# Patient Record
Sex: MALE | Race: WHITE | URBAN - METROPOLITAN AREA
[De-identification: names, ages, dates, MRNs, and addresses within clinical notes are randomized per-mention and may not be internally consistent; named-entity substitution may affect disease eponyms.]

---

## 2020-05-22 NOTE — IMPRESSION/PLAN
Impression: Central retinal vein occlusion, bi, with macular edema: H34.1958. Plan: Discussed diagnosis in detail with patient. Discussed treatment options with patient. Consult recommended [[facility] Discussed risks of progression. Call if [address] worsens. Needs immediate HTN TX advised to go to ER or his GP ASAP.

## 2020-05-26 NOTE — IMPRESSION/PLAN
Impression: Central retinal vein occlusion, bi, with macular edema: H34.8130. Plan: OCT ordered and performed today. Discussed diagnosis with patient. The clinical exam and OCT testing are consistent with a non-ischemic central retinal vein occlusion. There is Macular edema, which is largely contributing to patients visual symptoms. Discussed treatment options, discussed injections vs observation. R/B/A were discussed and the patient understands. The patient understands that the treatment may not improve vision loss, but should reduce the risk of further visual loss. Patient elects to proceed with Avastin OU x3 starting today.

## 2020-08-25 NOTE — IMPRESSION/PLAN
Impression: Trib rtnl vein occlusion, bilateral, with macular edema: F71.0154. Plan: OCT ordered and performed today. The clinical exam and OCT testing are consistent with branch retinal vein occlusion. Discussed diagnosis and natural history and prognosis with patient. The is evidence of Macular edema, which largely explains the patients visual symptoms. Discussed treatment options. Alternative laser vs intraocular and periocular steroids vs injections vs observation. R/B/A were discussed and the patient understands. The patient understands the treatment may not improve vision, but should reduce the risk of further visual loss.  Patient elects to proceed with Avastin OU x 3 starting today

## 2020-08-25 NOTE — ASSESSMENT/PLAN
Impression: Diagnostic Test - OCT MAC: Fair-See plan for further clinical correlation.  Plan: see chart note

## 2020-12-01 NOTE — IMPRESSION/PLAN
Impression: Trib rtnl vein occlusion, bilateral, with macular edema: W32.9632. Plan: OCT ordered and performed today. The clinical exam and OCT testing are consistent with branch retinal vein occlusion. Discussed diagnosis and natural history and prognosis with patient. The is evidence of Macular edema, which largely explains the patients visual symptoms. Discussed treatment options. Alternative laser vs intraocular and periocular steroids vs injections vs observation. R/B/A were discussed and the patient understands. The patient understands the treatment may not improve vision, but should reduce the risk of further visual loss. Patient elects to proceed with OZD OD W/ Avastin OS @ 6 weeks apart x 3.

## 2021-02-09 NOTE — IMPRESSION/PLAN
Impression: Tributary (branch) retinal vein occlusion, bilateral, stable: I02.5074. S/p OZD OD
S/p Avastin OS Plan: OCT ordered and performed today. The patient returns today for an evaluation of Branch retinal vein occlusion. The vision remains stable exam and OCT test show no evidence of edema or neovascularization  at this time we will continue to observe the patient was advised to work closely with PCP to control blood pressure and lipids.

## 2021-04-14 NOTE — IMPRESSION/PLAN
Impression: Chronic BRVO, OD. S/p Ozurdex last 12/1/2020 Plan: The patient reports no VA changes with injections OD in the past. Exam and OCT reveal CME OD (738 microns). Observe for now.

## 2021-04-14 NOTE — IMPRESSION/PLAN
Impression: BRVO, OS.
s/p Avastin last 01/12/2021 Plan: The patient is here to transfer care. Exam and OCT reveal CME OS (414 microns). Recommend Avastin. RBA discussed. The patient elects Avastin OS. Will schedule. 

Return in 1 week for Avastin OS, and in 4-6 weeks for re-eval CME, OCT OU, IVFA OS 1st, possible Avastin OS

## 2021-06-04 NOTE — IMPRESSION/PLAN
Impression: Chronic BRVO, OD. S/p Ozurdex last 12/1/2020 Plan: The patient reports no VA changes with injections OD in the past. Exam and OCT reveal severe CME OD. Observe for now.

## 2021-06-04 NOTE — IMPRESSION/PLAN
Impression: BRVO, OS.
s/p Avastin last 04/21/2021  Plan: Exam and OCT reveal CME OS (310 microns, from 414 microns). An IVFA from 06/04/2021 demonstrated no NVE. Fundus Photos from 06/04/2021 demonstrated no NVD. Recommend Avastin. RBA discussed. The patient elects Avastin OS. Return in  4-6 weeks for re-eval CME, OCT OU, possible Avastin OS

## 2021-07-07 NOTE — IMPRESSION/PLAN
Impression: BRVO, OS.
s/p Avastin last 06/04/2021  Plan: Exam and OCT reveal CME OS (295 microns, from 414 microns). An IVFA from 06/04/2021 demonstrated no NVE. Fundus Photos from 06/04/2021 demonstrated no NVD. Recommend Avastin. RBA discussed. The patient elects Avastin OS. Return in  4-6 weeks for re-eval CME, OCT OU, possible Avastin OS

## 2021-09-01 NOTE — IMPRESSION/PLAN
Impression: Chronic BRVO, OD. S/p Ozurdex last 12/1/2020 Plan: The patient feels his VA OD is worse. Exam and OCT reveal severe CME OD (718 microns). Discussed options of observation vs Avastin. RBA discussed.  The patient elects Avastin (bilateal)

## 2021-09-01 NOTE — IMPRESSION/PLAN
Impression: BRVO, OS.
s/p Avastin last 07/07/2021  Plan: Exam and OCT reveal CME OS (304 microns, from 414 microns). An IVFA from 06/04/2021 demonstrated no NVE. Fundus Photos from 06/04/2021 demonstrated no NVD. Recommend Avastin. RBA discussed. The patient elects Avastin (bilateral) Return in  4-6 weeks for re-eval CME, OCT OU, possible Avastin OU

## 2021-10-20 NOTE — IMPRESSION/PLAN
Impression: BRVO, OS.
s/p Avastin last 09/01/2021  Plan: Exam and OCT reveal CME OS (297 microns, from 414 microns). An IVFA from 06/04/2021 demonstrated no NVE. Fundus Photos from 06/04/2021 demonstrated no NVD. Recommend Avastin. RBA discussed. The patient elects Avastin (bilateral) Return in  4-6 weeks for re-eval CME, OCT OU, possible Avastin OU

## 2021-10-20 NOTE — IMPRESSION/PLAN
Impression: Chronic BRVO, OD. S/p Ozurdex last 12/1/2020
s/p Avastin  last 09/01/2021  Plan: The patient feels his VA OD is worse. Exam and OCT reveal severe CME OD (689 microns, from 718 microns). Discussed options of observation vs Avastin. RBA discussed.  The patient elects Avastin (bilateal)

## 2021-11-17 NOTE — IMPRESSION/PLAN
Impression: Chronic BRVO, OD. S/p Ozurdex last 12/1/2020
s/p Avastin  last 10/20/2021 Plan: The patient feels his VA OD is worse. Exam and OCT reveal severe CME OD (689 microns, from 718 microns). Discussed options of observation vs Avastin. RBA discussed.  The patient elects Avastin (bilateal)

## 2021-11-17 NOTE — IMPRESSION/PLAN
Impression: BRVO, OS.
s/p Avastin last 10/20/2021 Plan: Exam and OCT reveal CME OS (293 microns, from 414 microns). An IVFA from 06/04/2021 demonstrated no NVE. Fundus Photos from 06/04/2021 demonstrated no NVD. Recommend Avastin. RBA discussed. The patient elects Avastin (bilateral) Return in  4-6 weeks for re-eval CME, OCT OU, possible Avastin OU

## 2022-01-05 NOTE — IMPRESSION/PLAN
Impression: Chronic BRVO, OD. S/p Ozurdex last 12/1/2020
s/p Avastin  last 11/17/2021 Plan: The patient feels his VA OD is worse. Exam and OCT reveal severe CME OD (664 microns, from 718 microns). Discussed options of observation vs Avastin. RBA discussed.  The patient elects Avastin (bilateal)

## 2022-01-05 NOTE — IMPRESSION/PLAN
Impression: BRVO, OS.
s/p Avastin last 11/17/2021 Plan: Exam and OCT reveal CME OS (295 microns, from 414 microns). An IVFA from 06/04/2021 demonstrated no NVE. Fundus Photos from 06/04/2021 demonstrated no NVD. Recommend Avastin. RBA discussed. The patient elects Avastin (bilateral) Return in  4-6 weeks for re-eval CME, OCT OU, possible Avastin OU

## 2022-02-16 NOTE — IMPRESSION/PLAN
Impression: BRVO, OS.
s/p Avastin last 01/05/2022 Plan: Exam and OCT reveal CME OS (295 microns, from 414 microns). An IVFA from 06/04/2021 demonstrated no NVE. Fundus Photos from 06/04/2021 demonstrated no NVD. Recommend Avastin. RBA discussed. The patient elects Avastin (bilateral) Return in 4-6 weeks for re-eval CME, OCT OU, possible Avastin OU

## 2022-02-16 NOTE — IMPRESSION/PLAN
Impression: Chronic BRVO, OD. S/p Ozurdex last 12/1/2020
s/p Avastin  last 01/05/2022 Plan: The patient feels his VA OD is worse. Exam and OCT reveal severe CME OD (526 microns, from 718 microns). Discussed options of observation vs Avastin. RBA discussed.  The patient elects Avastin (bilateal)

## 2022-04-06 NOTE — IMPRESSION/PLAN
Impression: Chronic BRVO, OD. S/p Ozurdex last 12/1/2020
s/p Avastin  last 02/16/2022 Plan: The patient feels his VA OD is worse. Exam and OCT reveal severe CME OD (526 microns, from 718 microns). Discussed options of observation vs Avastin. Recommend Avastin then Ozurdex. RBA discussed.  The patient elects Avastin (bilateal)

## 2022-04-06 NOTE — IMPRESSION/PLAN
Impression: BRVO, OS.
s/p Avastin last 02/16/2022 Plan: Exam and OCT reveal CME OS (304 microns, from 295 microns, from 414 microns). An IVFA from 06/04/2021 demonstrated no NVE. Fundus Photos from 06/04/2021 demonstrated no NVD. Recommend Avastin series with focal. 
RBA discussed. The patient elects Avastin (bilateral) Return in 2 days for focal OS, then Ozurdex OD, and in 4-6 weeks for re-eval CME, OCT OU, possible Avastin OU

## 2022-05-04 ENCOUNTER — OFFICE VISIT (OUTPATIENT)
Dept: URBAN - METROPOLITAN AREA CLINIC 27 | Facility: CLINIC | Age: 52
End: 2022-05-04
Payer: COMMERCIAL

## 2022-05-04 DIAGNOSIS — H25.13 AGE-RELATED NUCLEAR CATARACT, BILATERAL: ICD-10-CM

## 2022-05-04 DIAGNOSIS — H04.123 DRY EYE SYNDROME OF BILATERAL LACRIMAL GLANDS: ICD-10-CM

## 2022-05-04 DIAGNOSIS — H43.813 VITREOUS DEGENERATION, BILATERAL: ICD-10-CM

## 2022-05-04 DIAGNOSIS — H34.8332 TRIBUTARY (BRANCH) RETINAL VEIN OCCLUSION, BILATERAL, STABLE: Primary | ICD-10-CM

## 2022-05-04 DIAGNOSIS — H35.81 RETINAL EDEMA: ICD-10-CM

## 2022-05-04 PROCEDURE — 92134 CPTRZ OPH DX IMG PST SGM RTA: CPT | Performed by: OPHTHALMOLOGY

## 2022-05-04 PROCEDURE — 99024 POSTOP FOLLOW-UP VISIT: CPT | Performed by: OPHTHALMOLOGY

## 2022-05-04 ASSESSMENT — INTRAOCULAR PRESSURE
OS: 18
OD: 31

## 2022-05-04 NOTE — IMPRESSION/PLAN
Impression: BRVO, OS.
s/p Focal 04/08/2022
s/p Avastin last 04/06/2022
s/p Ozurdex 04/3/2022 Plan: Exam and OCT reveal CME OS (290 microns, from 414 microns). An IVFA from 06/04/2021 demonstrated no NVE. Fundus Photos from 06/04/2021 demonstrated no NVD. Recommend Avastin. RBA discussed. The patient elects Avastin (bilateral) Return in  4-6 weeks for re-eval CME, OCT OU, possible Avastin OU

## 2022-05-04 NOTE — IMPRESSION/PLAN
Impression: Chronic BRVO, OD. S/p Ozurdex last 12/1/2020
s/p Avastin  last 04/06/2022 Plan: The patient feels his VA OD is worse. Exam and OCT reveal severe CME OD (206 microns, from 526 microns, from 718 microns). Discussed options of observation vs Avastin. Recommend Avastin. RBA discussed.  The patient elects Avastin (bilateal)

## 2022-06-01 ENCOUNTER — OFFICE VISIT (OUTPATIENT)
Dept: URBAN - METROPOLITAN AREA CLINIC 27 | Facility: CLINIC | Age: 52
End: 2022-06-01
Payer: COMMERCIAL

## 2022-06-01 DIAGNOSIS — H34.8330 TRIBUTARY (BRANCH) RETINAL VEIN OCCLUSION, BILATERAL, WITH MACULAR EDEMA: ICD-10-CM

## 2022-06-01 DIAGNOSIS — H25.13 AGE-RELATED NUCLEAR CATARACT, BILATERAL: ICD-10-CM

## 2022-06-01 DIAGNOSIS — H35.81 RETINAL EDEMA: ICD-10-CM

## 2022-06-01 DIAGNOSIS — H43.813 VITREOUS DEGENERATION, BILATERAL: ICD-10-CM

## 2022-06-01 DIAGNOSIS — H04.123 DRY EYE SYNDROME OF BILATERAL LACRIMAL GLANDS: ICD-10-CM

## 2022-06-01 DIAGNOSIS — H34.8332 TRIBUTARY (BRANCH) RETINAL VEIN OCCLUSION, BILATERAL, STABLE: Primary | ICD-10-CM

## 2022-06-01 PROCEDURE — 92134 CPTRZ OPH DX IMG PST SGM RTA: CPT | Performed by: OPHTHALMOLOGY

## 2022-06-01 PROCEDURE — 99024 POSTOP FOLLOW-UP VISIT: CPT | Performed by: OPHTHALMOLOGY

## 2022-06-01 ASSESSMENT — INTRAOCULAR PRESSURE
OD: 29
OS: 14

## 2022-06-01 NOTE — IMPRESSION/PLAN
Impression: BRVO, OS.
s/p Focal 04/08/2022
s/p Avastin last 05/04/2022 
s/p Ozurdex 04/3/2022 Plan: Exam and OCT reveal CME OS (293 microns, from 414 microns). An IVFA from 06/04/2021 demonstrated no NVE. Fundus Photos from 06/04/2021 demonstrated no NVD. Recommend Avastin. RBA discussed. The patient elects Avastin (bilateral) Return in 4-6 weeks for re-eval CME, OCT OU, possible Avastin OU

## 2022-06-01 NOTE — IMPRESSION/PLAN
Impression: Chronic BRVO, OD. S/p Ozurdex last 12/1/2020
s/p Avastin  last 05/04/2022 Plan: The patient feels his VA OD is worse. Exam and OCT reveal severe CME OD (199 microns, from 526 microns, from 718 microns). Discussed options of observation vs Avastin. Recommend Avastin. RBA discussed.  The patient elects Avastin (bilateal)

## 2022-06-29 ENCOUNTER — OFFICE VISIT (OUTPATIENT)
Dept: URBAN - METROPOLITAN AREA CLINIC 27 | Facility: CLINIC | Age: 52
End: 2022-06-29
Payer: COMMERCIAL

## 2022-06-29 DIAGNOSIS — H35.81 RETINAL EDEMA: ICD-10-CM

## 2022-06-29 DIAGNOSIS — H25.13 AGE-RELATED NUCLEAR CATARACT, BILATERAL: ICD-10-CM

## 2022-06-29 DIAGNOSIS — H04.123 DRY EYE SYNDROME OF BILATERAL LACRIMAL GLANDS: ICD-10-CM

## 2022-06-29 DIAGNOSIS — H34.8332 TRIBUTARY (BRANCH) RETINAL VEIN OCCLUSION, BILATERAL, STABLE: Primary | ICD-10-CM

## 2022-06-29 DIAGNOSIS — H34.8330 TRIBUTARY (BRANCH) RETINAL VEIN OCCLUSION, BILATERAL, WITH MACULAR EDEMA: ICD-10-CM

## 2022-06-29 DIAGNOSIS — H43.813 VITREOUS DEGENERATION, BILATERAL: ICD-10-CM

## 2022-06-29 PROCEDURE — 92134 CPTRZ OPH DX IMG PST SGM RTA: CPT | Performed by: OPHTHALMOLOGY

## 2022-06-29 PROCEDURE — 99024 POSTOP FOLLOW-UP VISIT: CPT | Performed by: OPHTHALMOLOGY

## 2022-06-29 RX ORDER — DORZOLAMIDE HYDROCHLORIDE AND TIMOLOL MALEATE 20; 5 MG/ML; MG/ML
SOLUTION/ DROPS OPHTHALMIC
Qty: 1 | Refills: 3 | Status: ACTIVE
Start: 2022-06-29

## 2022-06-29 ASSESSMENT — INTRAOCULAR PRESSURE
OD: 26
OS: 13

## 2022-06-29 NOTE — IMPRESSION/PLAN
Impression: NVG OD Chronic BRVO, OD. S/p Ozurdex last 12/1/2020
s/p Avastin  last 06/01/2022 Plan: The patient feels his VA OD is worse. Exam and OCT reveal severe CME OD (220 microns, from 526 microns, from 718 microns). Discussed options of observation vs Avastin. Recommend Avastin. RBA discussed.  The patient elects Avastin (bilateal)

## 2022-06-29 NOTE — IMPRESSION/PLAN
Impression: BRVO, OS.
s/p Focal 04/08/2022
s/p Avastin last 06/01/2022 
s/p Ozurdex 04/3/2022 Plan: Exam and OCT reveal CME OS (310 microns, from 414 microns). An IVFA from 06/04/2021 demonstrated no NVE. Fundus Photos from 06/04/2021 demonstrated no NVD. Recommend Avastin. RBA discussed. The patient elects Avastin (bilateral) Return in 4-6 weeks for re-eval CME, OCT OU, possible Avastin OU

## 2022-08-24 ENCOUNTER — OFFICE VISIT (OUTPATIENT)
Dept: URBAN - METROPOLITAN AREA CLINIC 27 | Facility: CLINIC | Age: 52
End: 2022-08-24
Payer: COMMERCIAL

## 2022-08-24 DIAGNOSIS — H04.123 DRY EYE SYNDROME OF BILATERAL LACRIMAL GLANDS: ICD-10-CM

## 2022-08-24 DIAGNOSIS — H25.13 AGE-RELATED NUCLEAR CATARACT, BILATERAL: ICD-10-CM

## 2022-08-24 DIAGNOSIS — H43.813 VITREOUS DEGENERATION, BILATERAL: ICD-10-CM

## 2022-08-24 DIAGNOSIS — H35.81 RETINAL EDEMA: ICD-10-CM

## 2022-08-24 DIAGNOSIS — H34.8332 TRIBUTARY (BRANCH) RETINAL VEIN OCCLUSION, BILATERAL, STABLE: Primary | ICD-10-CM

## 2022-08-24 DIAGNOSIS — H34.8330 TRIBUTARY (BRANCH) RETINAL VEIN OCCLUSION, BILATERAL, WITH MACULAR EDEMA: ICD-10-CM

## 2022-08-24 PROCEDURE — 92134 CPTRZ OPH DX IMG PST SGM RTA: CPT | Performed by: OPHTHALMOLOGY

## 2022-08-24 PROCEDURE — 92014 COMPRE OPH EXAM EST PT 1/>: CPT | Performed by: OPHTHALMOLOGY

## 2022-08-24 PROCEDURE — 67210 TREATMENT OF RETINAL LESION: CPT | Performed by: OPHTHALMOLOGY

## 2022-08-24 ASSESSMENT — INTRAOCULAR PRESSURE
OS: 12
OD: 18

## 2022-08-24 NOTE — IMPRESSION/PLAN
Impression: BRVO, OS.
s/p Focal 04/08/2022
s/p Avastin last 06/29/2022 
s/p Ozurdex 04/3/2022 Plan: Exam and OCT reveal CME OS (337 microns, from 414 microns). An IVFA from 06/04/2021 demonstrated no NVE. Fundus Photos from 06/04/2021 demonstrated no NVD. Recommend Avastin series with focal. 
RBA discussed. The patient elects Focal laser today. 

Return in 1 week for Avastin OU, and in 4-6 weeks for re-eval CME, OCT OU, possible Avastin OU

## 2022-08-24 NOTE — IMPRESSION/PLAN
Impression: NVG OD Chronic BRVO, OD. S/p Ozurdex last 12/1/2020
s/p Avastin  last 06/29/2022 Plan: The patient feels his VA OD is worse. Exam and OCT reveal severe CME OD (360 microns, from 526 microns, from 718 microns). Discussed options of observation vs Avastin. Recommend Avastin. Will schedule.

## 2022-08-31 ENCOUNTER — OFFICE VISIT (OUTPATIENT)
Dept: URBAN - METROPOLITAN AREA CLINIC 27 | Facility: CLINIC | Age: 52
End: 2022-08-31
Payer: COMMERCIAL

## 2022-08-31 DIAGNOSIS — H34.8330 TRIBUTARY (BRANCH) RETINAL VEIN OCCLUSION, BILATERAL, WITH MACULAR EDEMA: Primary | ICD-10-CM

## 2022-08-31 ASSESSMENT — INTRAOCULAR PRESSURE
OD: 16
OS: 18

## 2022-10-05 ENCOUNTER — OFFICE VISIT (OUTPATIENT)
Dept: URBAN - METROPOLITAN AREA CLINIC 27 | Facility: CLINIC | Age: 52
End: 2022-10-05
Payer: COMMERCIAL

## 2022-10-05 DIAGNOSIS — H35.81 RETINAL EDEMA: ICD-10-CM

## 2022-10-05 DIAGNOSIS — H43.813 VITREOUS DEGENERATION, BILATERAL: ICD-10-CM

## 2022-10-05 DIAGNOSIS — H25.13 AGE-RELATED NUCLEAR CATARACT, BILATERAL: ICD-10-CM

## 2022-10-05 DIAGNOSIS — H34.8330 TRIBUTARY (BRANCH) RETINAL VEIN OCCLUSION, BILATERAL, WITH MACULAR EDEMA: ICD-10-CM

## 2022-10-05 DIAGNOSIS — H34.8332 TRIBUTARY (BRANCH) RETINAL VEIN OCCLUSION, BILATERAL, STABLE: Primary | ICD-10-CM

## 2022-10-05 DIAGNOSIS — H04.123 DRY EYE SYNDROME OF BILATERAL LACRIMAL GLANDS: ICD-10-CM

## 2022-10-05 PROCEDURE — 99024 POSTOP FOLLOW-UP VISIT: CPT | Performed by: OPHTHALMOLOGY

## 2022-10-05 PROCEDURE — 92134 CPTRZ OPH DX IMG PST SGM RTA: CPT | Performed by: OPHTHALMOLOGY

## 2022-10-05 ASSESSMENT — INTRAOCULAR PRESSURE
OS: 18
OD: 18

## 2022-10-05 NOTE — IMPRESSION/PLAN
Impression: NVG OD Chronic BRVO, OD. S/p Ozurdex last 12/1/2020
s/p Avastin  last 08/31/2022 Plan: The patient feels his VA OD is worse. Exam and OCT reveal severe CME OD (222 microns, from 360 microns, from 526 microns, from 718 microns). Discussed options of observation vs Avastin. Recommend Avastin. RBA discussed. The patient elects Avastin (bilateral)

## 2022-10-05 NOTE — IMPRESSION/PLAN
Impression: BRVO, OS.
s/p Focal 08/24/2022
s/p Avastin last 08/31/2022
s/p Ozurdex 04/3/2022 Plan: Exam and OCT reveal CME OS (291 microns, from 414 microns). An IVFA from 06/04/2021 demonstrated no NVE. Fundus Photos from 06/04/2021 demonstrated no NVD. Recommend Avastin. RBA discussed. The patient elects Avastin (bilateral) Return in  4-6 weeks for re-eval CME, OCT OU, possible Avastin OU

## 2022-11-09 ENCOUNTER — OFFICE VISIT (OUTPATIENT)
Dept: URBAN - METROPOLITAN AREA CLINIC 27 | Facility: CLINIC | Age: 52
End: 2022-11-09
Payer: COMMERCIAL

## 2022-11-09 DIAGNOSIS — H25.13 AGE-RELATED NUCLEAR CATARACT, BILATERAL: ICD-10-CM

## 2022-11-09 DIAGNOSIS — H35.81 RETINAL EDEMA: ICD-10-CM

## 2022-11-09 DIAGNOSIS — H43.813 VITREOUS DEGENERATION, BILATERAL: ICD-10-CM

## 2022-11-09 DIAGNOSIS — H34.8332 TRIBUTARY (BRANCH) RETINAL VEIN OCCLUSION, BILATERAL, STABLE: Primary | ICD-10-CM

## 2022-11-09 DIAGNOSIS — H04.123 DRY EYE SYNDROME OF BILATERAL LACRIMAL GLANDS: ICD-10-CM

## 2022-11-09 PROCEDURE — 99024 POSTOP FOLLOW-UP VISIT: CPT | Performed by: OPHTHALMOLOGY

## 2022-11-09 PROCEDURE — 92134 CPTRZ OPH DX IMG PST SGM RTA: CPT | Performed by: OPHTHALMOLOGY

## 2022-11-09 ASSESSMENT — INTRAOCULAR PRESSURE
OD: 15
OS: 18

## 2022-11-09 NOTE — IMPRESSION/PLAN
Impression: BRVO, OS.
s/p Focal 08/24/2022
s/p Avastin last 10/05/2022 
s/p Ozurdex 04/3/2022 Plan: Exam and OCT reveal CME OS (266 microns, from 414 microns). An IVFA from 06/04/2021 demonstrated no NVE. Fundus Photos from 06/04/2021 demonstrated no NVD. Recommend Avastin. RBA discussed. The patient elects Avastin (bilateral) Return in  4-6 weeks for re-eval CME, OCT OU, possible Avastin OU, IVFA OS 1st

## 2022-11-09 NOTE — IMPRESSION/PLAN
Impression: NVG OD Chronic BRVO, OD. S/p Ozurdex last 12/1/2020
s/p Avastin  last 10/05/2022  Plan: The patient feels his VA OD is worse. Exam and OCT reveal severe CME OD (223 microns, from 360 microns, from 526 microns, from 718 microns). Discussed options of observation vs Avastin. Recommend Avastin. RBA discussed.  The patient elects Avastin (bilateral)

## 2023-01-06 NOTE — IMPRESSION/PLAN
Impression: BRVO, OS.
s/p Focal 08/24/2022
s/p Avastin last 11/09/2022
s/p Ozurdex 04/3/2022 Plan: Exam and OCT reveal CME OS (295 microns, from 414 microns). An IVFA from 01/06/2023 demonstrated no NVE. Fundus Photos from 01/06/2023 demonstrated no NVD. Recommend Avastin. RBA discussed. The patient elects Avastin (bilateral) Return in  4-6 weeks for re-eval CME, OCT OU, possible Avastin OU

## 2023-01-06 NOTE — IMPRESSION/PLAN
Impression: NVG OD Chronic BRVO, OD. S/p Ozurdex last 12/1/2020
s/p Avastin  last 11/09/2022  Plan: The patient feels his VA OD is worse. Exam and OCT reveal severe CME OD (387 microns, from 223 microns, from 360 microns, from 526 microns, from 718 microns). Discussed options of observation vs Avastin. Recommend Avastin. RBA discussed.  The patient elects Avastin (bilateral)

## 2023-02-03 NOTE — IMPRESSION/PLAN
Impression: NVG OD Chronic BRVO, OD. S/p Ozurdex last 12/1/2020
s/p Avastin  last 01/06/2023 Plan: The patient feels his VA OD is worse. Exam and OCT reveal severe CME OD (211 microns, from 387 microns, from 223 microns, from 360 microns, from 526 microns, from 718 microns). Discussed options of observation vs Avastin. Recommend Avastin. RBA discussed.  The patient elects Avastin (bilateral)

## 2023-02-03 NOTE — IMPRESSION/PLAN
Impression: BRVO, OS.
s/p Focal 08/24/2022
s/p Avastin last 01/06/2023
s/p Ozurdex 04/3/2022 Plan: Exam and OCT reveal CME OS (291 microns, from 414 microns). An IVFA from 01/06/2023 demonstrated no NVE. Fundus Photos from 01/06/2023 demonstrated no NVD. Recommend Avastin. RBA discussed. The patient elects Avastin (bilateral) Return in  4-6 weeks for re-eval CME, OCT OU, possible Avastin OU

## 2023-03-10 NOTE — IMPRESSION/PLAN
Impression: NVG OD Chronic BRVO, OD. S/p Ozurdex last 12/1/2020
s/p Avastin  last 02/03/2023 Plan: The patient feels his VA OD is worse. Exam and OCT reveal severe CME OD (214 microns, from 718 microns). Discussed options of observation vs Avastin. Recommend Avastin. RBA discussed.  The patient elects Avastin (bilateral)

## 2023-03-10 NOTE — IMPRESSION/PLAN
Impression: BRVO, OS.
s/p Focal 01/18/2023
s/p Avastin last 02/03/2023
s/p Ozurdex 04/3/2022 Plan: Exam and OCT reveal CME OS (292 microns, from 414 microns). An IVFA from 01/06/2023 demonstrated no NVE. Fundus Photos from 01/06/2023 demonstrated no NVD. Recommend Avastin. RBA discussed. The patient elects Avastin (bilateral) Return in  4-6 weeks for re-eval CME, OCT OU, possible Avastin OU

## 2023-04-21 NOTE — IMPRESSION/PLAN
Impression: NVG OD Chronic BRVO, OD. S/p Ozurdex last 12/1/2020
s/p Avastin  last 03/10/2023  Plan: The patient feels his VA OD is worse. Exam and OCT reveal severe CME OD (214 microns, from 718 microns). Discussed options of observation vs Avastin. Recommend Avastin. RBA discussed.  The patient elects Avastin (bilateral)

## 2023-04-21 NOTE — IMPRESSION/PLAN
Impression: BRVO, OS.
s/p Focal 01/18/2023
s/p Avastin last 03/10/2023 
s/p Ozurdex 04/3/2022 Plan: Exam and OCT reveal CME OS (292 microns, from 414 microns). An IVFA from 01/06/2023 demonstrated no NVE. Fundus Photos from 01/06/2023 demonstrated no NVD. Recommend Avastin sereis with focal. 
RBA discussed. The patient elects Avastin (bilateral). Carotenoids. 

Return in  2 weeks for focal OS, and in 4-6 weeks for re-eval CME, OCT OU, possible Avastin OU

## 2023-05-19 NOTE — IMPRESSION/PLAN
Impression: BRVO, OS.
s/p Focal 05/03/2023
s/p Avastin last 04/21/2023 
s/p Ozurdex 04/3/2022 Plan: Exam and OCT reveal CME OS (309 microns, from 414 microns). An IVFA from 01/06/2023 demonstrated no NVE. Fundus Photos from 01/06/2023 demonstrated no NVD. Carotenoids. Recommend Avastin. RBA discussed. The patient elects Avastin (bilateral).  

Return in 6 weeks for re-eval CME, OCT OU, possible Avastin OU

## 2023-05-19 NOTE — IMPRESSION/PLAN
Impression: NVG OD Chronic BRVO, OD. S/p Ozurdex last 12/1/2020
s/p Avastin  last 03/10/2023 Plan: The patient feels his VA OD is worse. Exam and OCT reveal extrafoveal CME OD (222 microns, from 718 microns). Discussed options of observation vs Avastin. Recommend Avastin. RBA discussed.  The patient elects Avastin (bilateral)

## 2023-07-07 NOTE — IMPRESSION/PLAN
Impression: BRVO with macular edema OU Plan: Exam/OCT show BRVO with persistent CME OD. Reviewed RBA of obs, Avastin, and laser; recommend Avastin OD today. Submit [place] BI. Exam/OCT show BRVO with persistent CME OS. Reviewed RBA of obs, Avastin, and laser; recommend Avastin OS today. Submit [place] BI. 4-6 weeks, OCT OU, reeval RVO (Cim)